# Patient Record
Sex: MALE | Race: OTHER | HISPANIC OR LATINO | URBAN - METROPOLITAN AREA
[De-identification: names, ages, dates, MRNs, and addresses within clinical notes are randomized per-mention and may not be internally consistent; named-entity substitution may affect disease eponyms.]

---

## 2017-03-18 ENCOUNTER — EMERGENCY (EMERGENCY)
Facility: HOSPITAL | Age: 27
LOS: 1 days | Discharge: HOME | End: 2017-03-18
Attending: EMERGENCY MEDICINE
Payer: MEDICAID

## 2017-03-18 VITALS
OXYGEN SATURATION: 99 % | WEIGHT: 169.98 LBS | HEART RATE: 77 BPM | TEMPERATURE: 98 F | RESPIRATION RATE: 16 BRPM | SYSTOLIC BLOOD PRESSURE: 142 MMHG | HEIGHT: 73 IN | DIASTOLIC BLOOD PRESSURE: 74 MMHG
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
## 2017-03-18 DIAGNOSIS — S52.501A UNSPECIFIED FRACTURE OF THE LOWER END OF RIGHT RADIUS, INITIAL ENCOUNTER FOR CLOSED FRACTURE: ICD-10-CM

## 2017-03-18 PROCEDURE — 29125 APPL SHORT ARM SPLINT STATIC: CPT | Mod: RT

## 2017-03-18 PROCEDURE — 73110 X-RAY EXAM OF WRIST: CPT | Mod: 26,RT

## 2017-03-18 PROCEDURE — 99284 EMERGENCY DEPT VISIT MOD MDM: CPT | Mod: 25

## 2017-03-18 PROCEDURE — 73110 X-RAY EXAM OF WRIST: CPT

## 2017-03-18 PROCEDURE — 99283 EMERGENCY DEPT VISIT LOW MDM: CPT

## 2017-03-18 RX ORDER — IBUPROFEN 200 MG
600 TABLET ORAL ONCE
Qty: 0 | Refills: 0 | Status: COMPLETED | OUTPATIENT
Start: 2017-03-18 | End: 2017-03-18

## 2017-03-18 RX ADMIN — Medication 600 MILLIGRAM(S): at 17:50

## 2017-03-18 RX ADMIN — Medication 600 MILLIGRAM(S): at 17:37

## 2017-03-18 NOTE — ED PROVIDER NOTE - NS ED MD SCRIBE ATTENDING SCRIBE SECTIONS
PAST MEDICAL/SURGICAL/SOCIAL HISTORY/HISTORY OF PRESENT ILLNESS/REVIEW OF SYSTEMS/PHYSICAL EXAM/HIV/DISPOSITION/VITAL SIGNS( Pullset)

## 2017-03-18 NOTE — ED PROVIDER NOTE - PHYSICAL EXAMINATION
Msk: L wrist, radial pulse 2+, able to passively extend, swelling dorsum of wrist   5/5, cap refill <2 seconds.

## 2017-03-18 NOTE — ED ADULT NURSE NOTE - OBJECTIVE STATEMENT
Patient complains of pain to right wrist s/p slip and fall. Moving all fingers of right hand. Capillary refill less than 3 seconds. Orthopedic consult at bedside.

## 2017-03-18 NOTE — ED PROVIDER NOTE - OBJECTIVE STATEMENT
25 y/o M w/ PMHx of asthma on Albuterol p/w R wrist pain and swelling onset today s/p slip and fall. Pt reports he slipped on ice outside his home. Pt denies LOC, head trauma, or any other complaint.